# Patient Record
Sex: MALE | Race: WHITE | NOT HISPANIC OR LATINO | URBAN - NONMETROPOLITAN AREA
[De-identification: names, ages, dates, MRNs, and addresses within clinical notes are randomized per-mention and may not be internally consistent; named-entity substitution may affect disease eponyms.]

---

## 2020-08-24 ENCOUNTER — IMPORTED ENCOUNTER (OUTPATIENT)
Dept: URBAN - NONMETROPOLITAN AREA CLINIC 1 | Facility: CLINIC | Age: 70
End: 2020-08-24

## 2020-08-24 PROBLEM — S05.01XA: Noted: 2020-08-24

## 2020-08-24 PROCEDURE — 92071 CONTACT LENS FITTING FOR TX: CPT

## 2020-08-24 PROCEDURE — 92002 INTRM OPH EXAM NEW PATIENT: CPT

## 2020-08-24 NOTE — PATIENT DISCUSSION
*CORNEAL ABRASION:. od-  Discussed findings of exam in detail with the patient. -  Discussed the acute nature and treatment options with the patient. -  Discussed the importance of follow-up and the risk of serious infection   The patient was warned to return to the office immediately if they experience loss of vision or increased pain. -  The use of preserved artificial tears  was discussed with patient. insert bcl odstart maxitrol 1 gtt qid x 1wk

## 2020-08-27 ENCOUNTER — IMPORTED ENCOUNTER (OUTPATIENT)
Dept: URBAN - NONMETROPOLITAN AREA CLINIC 1 | Facility: CLINIC | Age: 70
End: 2020-08-27

## 2020-08-27 PROCEDURE — 92012 INTRM OPH EXAM EST PATIENT: CPT

## 2020-08-27 NOTE — PATIENT DISCUSSION
*CORNEAL ABRASION:. od improved-  Discussed findings of exam in detail with the patient. -  Discussed the acute nature and treatment options with the patient. -  Discussed the importance of follow-up and the risk of serious infection   The patient was warned to return to the office immediately if they experience loss of vision or increased pain. -  The use of preserved artificial tears  was discussed with patient. bcl od came outstart jose archie tid with pressure patch x 3 days then tears 4-5 times daily

## 2020-10-08 ENCOUNTER — IMPORTED ENCOUNTER (OUTPATIENT)
Dept: URBAN - NONMETROPOLITAN AREA CLINIC 1 | Facility: CLINIC | Age: 70
End: 2020-10-08

## 2020-10-08 PROBLEM — H25.13: Noted: 2020-10-08

## 2020-10-08 PROBLEM — H52.223: Noted: 2020-10-08

## 2020-10-08 PROCEDURE — 92015 DETERMINE REFRACTIVE STATE: CPT

## 2020-10-08 PROCEDURE — 92014 COMPRE OPH EXAM EST PT 1/>: CPT

## 2020-10-08 NOTE — PATIENT DISCUSSION
Cataract OU-Not yet surgical. -Reviewed symptoms of advancing cataract growth such as glare and halos and decreased vision.-Continue to monitor for now. Pt will notify us if any new symptoms develop. Astigmatism-Discussed diagnosis with patient. Hyperopia-Discussed diagnosis with patient. Presbyopia-Discussed diagnosis with patient. Updated spec Rx given. Recommend lens that will provide comfort as well as protect safety and health of eyes.

## 2021-11-08 ENCOUNTER — IMPORTED ENCOUNTER (OUTPATIENT)
Dept: URBAN - NONMETROPOLITAN AREA CLINIC 1 | Facility: CLINIC | Age: 71
End: 2021-11-08

## 2021-11-08 PROCEDURE — 92014 COMPRE OPH EXAM EST PT 1/>: CPT

## 2021-11-08 PROCEDURE — 92015 DETERMINE REFRACTIVE STATE: CPT

## 2021-11-08 NOTE — PATIENT DISCUSSION
Cataract OU +PROGRESSION-Not yet surgical. -Reviewed symptoms of advancing cataract growth such as glare and halos and decreased vision.-Continue to monitor for now. Pt will notify us if any new symptoms develop. Astigmatism-Discussed diagnosis with patient. Hyperopia-Discussed diagnosis with patient. Presbyopia-Discussed diagnosis with patient. Updated spec Rx given. Recommend lens that will provide comfort as well as protect safety and health of eyes.

## 2022-04-09 ASSESSMENT — VISUAL ACUITY
OD_SC: 20/40
OS_SC: 20/25-2
OD_CC: J1+ WITH DIFFICULTY
OD_SC: 20/40
OS_SC: 20/20-1
OS_CC: J1+ WITH DIFFICULTY
OD_SC: 20/70-2
OS_SC: 20/25-2
OD_SC: 20/30

## 2022-04-09 ASSESSMENT — TONOMETRY
OS_IOP_MMHG: 15
OD_IOP_MMHG: 15
OD_IOP_MMHG: 16
OS_IOP_MMHG: 16

## 2022-11-09 ENCOUNTER — COMPREHENSIVE EXAM (OUTPATIENT)
Dept: RURAL CLINIC 1 | Facility: CLINIC | Age: 72
End: 2022-11-09

## 2022-11-09 DIAGNOSIS — H52.223: ICD-10-CM

## 2022-11-09 DIAGNOSIS — H25.13: ICD-10-CM

## 2022-11-09 PROCEDURE — 92014 COMPRE OPH EXAM EST PT 1/>: CPT

## 2022-11-09 PROCEDURE — 92015 DETERMINE REFRACTIVE STATE: CPT

## 2022-11-09 ASSESSMENT — VISUAL ACUITY
OD_CC: 20/30-2
OS_CC: 20/30
OU_CC: 20/20
OU_CC: 20/30-1
OS_CC: 20/20
OD_CC: 20/20

## 2022-11-09 ASSESSMENT — TONOMETRY
OS_IOP_MMHG: 17
OD_IOP_MMHG: 16

## 2023-06-23 ENCOUNTER — ESTABLISHED PATIENT (OUTPATIENT)
Dept: RURAL CLINIC 1 | Facility: CLINIC | Age: 73
End: 2023-06-23

## 2023-06-23 DIAGNOSIS — H25.13: ICD-10-CM

## 2023-06-23 PROCEDURE — 92014 COMPRE OPH EXAM EST PT 1/>: CPT

## 2023-06-23 ASSESSMENT — TONOMETRY
OS_IOP_MMHG: 16
OD_IOP_MMHG: 16

## 2023-06-23 ASSESSMENT — VISUAL ACUITY
OS_CC: 20/30
OU_CC: 20/30
OD_CC: 20/30
OS_CC: 20/20-1
OD_PH: 20/25
OD_CC: 20/50
OU_CC: 20/20-1

## 2023-07-10 ENCOUNTER — CONSULTATION/EVALUATION (OUTPATIENT)
Dept: RURAL CLINIC 1 | Facility: CLINIC | Age: 73
End: 2023-07-10

## 2023-07-10 DIAGNOSIS — H25.13: ICD-10-CM

## 2023-07-10 PROCEDURE — 99214 OFFICE O/P EST MOD 30 MIN: CPT

## 2023-07-10 ASSESSMENT — TONOMETRY
OD_IOP_MMHG: 16
OS_IOP_MMHG: 17

## 2023-07-10 ASSESSMENT — KERATOMETRY
OS_AXISANGLE2_DEGREES: 065
OS_K2POWER_DIOPTERS: 44.75
OS_AXISANGLE_DEGREES: 155
OD_AXISANGLE2_DEGREES: 5
OS_AXISANGLE2_DEGREES: 180
OS_K2POWER_DIOPTERS: 43.75
OD_K2POWER_DIOPTERS: 45.25
OD_AXISANGLE_DEGREES: 45
OS_AXISANGLE_DEGREES: 90
OD_K1POWER_DIOPTERS: 44.50
OD_K2POWER_DIOPTERS: 45.50
OD_AXISANGLE_DEGREES: 95
OD_K1POWER_DIOPTERS: 44.00
OS_K1POWER_DIOPTERS: 44.25
OS_K1POWER_DIOPTERS: 43.25
OD_AXISANGLE2_DEGREES: 135

## 2023-07-10 ASSESSMENT — VISUAL ACUITY
OS_BAT: 20/70
OD_SC: 20/50-1
OD_BAT: 20/100
OD_CC: 20/60
OS_CC: 20/30
OS_CC: 20/30
OD_PAM: 20/25
OD_PH: 20/40-1
OU_CC: 20/25-2
OD_CC: 20/30
OU_SC: 20/40
OS_SC: 20/40
OS_PH: 20/25-1
OS_AM: 20/25
OU_CC: 20/30

## 2023-09-07 ENCOUNTER — PRE-OP/H&P (OUTPATIENT)
Dept: RURAL CLINIC 1 | Facility: CLINIC | Age: 73
End: 2023-09-07

## 2023-09-07 VITALS
HEIGHT: 69 IN | RESPIRATION RATE: 23 BRPM | WEIGHT: 220 LBS | DIASTOLIC BLOOD PRESSURE: 75 MMHG | HEART RATE: 50 BPM | BODY MASS INDEX: 32.58 KG/M2 | SYSTOLIC BLOOD PRESSURE: 122 MMHG

## 2023-09-07 DIAGNOSIS — Z01.818: ICD-10-CM

## 2023-09-07 DIAGNOSIS — H25.13: ICD-10-CM

## 2023-09-07 PROCEDURE — 99499 UNLISTED E&M SERVICE: CPT

## 2023-09-07 ASSESSMENT — KERATOMETRY
OD_K1POWER_DIOPTERS: 44.00
OS_AXISANGLE_DEGREES: 155
OS_K1POWER_DIOPTERS: 43.25
OS_K1POWER_DIOPTERS: 44.25
OD_AXISANGLE2_DEGREES: 5
OD_AXISANGLE2_DEGREES: 135
OS_K2POWER_DIOPTERS: 43.75
OS_AXISANGLE2_DEGREES: 180
OS_K2POWER_DIOPTERS: 44.75
OS_AXISANGLE_DEGREES: 90
OD_AXISANGLE_DEGREES: 95
OD_K1POWER_DIOPTERS: 44.50
OS_AXISANGLE2_DEGREES: 065
OD_AXISANGLE_DEGREES: 45
OD_K2POWER_DIOPTERS: 45.25
OD_K2POWER_DIOPTERS: 45.50

## 2023-09-19 ENCOUNTER — SURGERY/PROCEDURE (OUTPATIENT)
Dept: URBAN - METROPOLITAN AREA SURGERY 3 | Facility: SURGERY | Age: 73
End: 2023-09-19

## 2023-09-19 DIAGNOSIS — H25.11: ICD-10-CM

## 2023-09-19 PROCEDURE — 68841 INSJ RX ELUT IMPLT LAC CANAL: CPT

## 2023-09-19 PROCEDURE — 66984 XCAPSL CTRC RMVL W/O ECP: CPT

## 2023-09-19 ASSESSMENT — KERATOMETRY
OD_K2POWER_DIOPTERS: 45.25
OS_K1POWER_DIOPTERS: 44.25
OS_K1POWER_DIOPTERS: 43.25
OD_AXISANGLE2_DEGREES: 135
OD_K2POWER_DIOPTERS: 45.50
OS_AXISANGLE_DEGREES: 155
OS_AXISANGLE2_DEGREES: 180
OD_K1POWER_DIOPTERS: 44.50
OD_K1POWER_DIOPTERS: 44.00
OS_K2POWER_DIOPTERS: 43.75
OS_AXISANGLE2_DEGREES: 065
OS_K2POWER_DIOPTERS: 44.75
OS_AXISANGLE_DEGREES: 90
OD_AXISANGLE2_DEGREES: 5
OD_AXISANGLE_DEGREES: 45
OD_AXISANGLE_DEGREES: 95

## 2023-09-20 ENCOUNTER — POST-OP (OUTPATIENT)
Dept: RURAL CLINIC 1 | Facility: CLINIC | Age: 73
End: 2023-09-20

## 2023-09-20 DIAGNOSIS — H25.12: ICD-10-CM

## 2023-09-20 DIAGNOSIS — Z96.1: ICD-10-CM

## 2023-09-20 PROCEDURE — 99024 POSTOP FOLLOW-UP VISIT: CPT

## 2023-09-20 ASSESSMENT — KERATOMETRY
OD_AXISANGLE2_DEGREES: 5
OD_AXISANGLE_DEGREES: 45
OS_AXISANGLE2_DEGREES: 065
OD_AXISANGLE_DEGREES: 95
OS_AXISANGLE_DEGREES: 90
OD_K2POWER_DIOPTERS: 45.50
OS_AXISANGLE_DEGREES: 155
OD_K2POWER_DIOPTERS: 45.25
OD_K1POWER_DIOPTERS: 44.50
OD_AXISANGLE2_DEGREES: 135
OS_K2POWER_DIOPTERS: 44.75
OS_K2POWER_DIOPTERS: 43.75
OS_K1POWER_DIOPTERS: 44.25
OD_K1POWER_DIOPTERS: 44.00
OS_K1POWER_DIOPTERS: 43.25
OS_AXISANGLE2_DEGREES: 180

## 2023-09-20 ASSESSMENT — TONOMETRY: OD_IOP_MMHG: 17

## 2023-09-20 ASSESSMENT — VISUAL ACUITY: OD_SC: 20/200

## 2023-09-25 ENCOUNTER — POST OP/EVAL OF SECOND EYE (OUTPATIENT)
Dept: RURAL CLINIC 1 | Facility: CLINIC | Age: 73
End: 2023-09-25

## 2023-09-25 DIAGNOSIS — Z96.1: ICD-10-CM

## 2023-09-25 DIAGNOSIS — H25.12: ICD-10-CM

## 2023-09-25 PROCEDURE — 99024 POSTOP FOLLOW-UP VISIT: CPT

## 2023-09-25 ASSESSMENT — KERATOMETRY
OS_K2POWER_DIOPTERS: 43.75
OS_K1POWER_DIOPTERS: 44.25
OD_K1POWER_DIOPTERS: 44.00
OS_AXISANGLE_DEGREES: 155
OD_AXISANGLE_DEGREES: 95
OS_AXISANGLE_DEGREES: 90
OS_AXISANGLE2_DEGREES: 065
OD_AXISANGLE2_DEGREES: 135
OS_AXISANGLE2_DEGREES: 180
OD_K2POWER_DIOPTERS: 45.25
OS_K1POWER_DIOPTERS: 43.25
OD_AXISANGLE_DEGREES: 45
OD_K1POWER_DIOPTERS: 44.50
OD_K2POWER_DIOPTERS: 45.50
OS_K2POWER_DIOPTERS: 44.75
OD_AXISANGLE2_DEGREES: 5

## 2023-09-25 ASSESSMENT — VISUAL ACUITY
OS_SC: 20/25+1
OU_SC: 20/100
OS_AM: 20/30-1
OS_SC: 20/200
OD_SC: 20/50+1
OU_SC: 20/25+1
OS_BAT: 20/400

## 2023-09-25 ASSESSMENT — TONOMETRY
OS_IOP_MMHG: 17
OD_IOP_MMHG: 17

## 2023-10-03 ENCOUNTER — SURGERY/PROCEDURE (OUTPATIENT)
Dept: URBAN - METROPOLITAN AREA SURGERY 3 | Facility: SURGERY | Age: 73
End: 2023-10-03

## 2023-10-03 DIAGNOSIS — H25.12: ICD-10-CM

## 2023-10-03 PROCEDURE — 68841 INSJ RX ELUT IMPLT LAC CANAL: CPT | Mod: 51,LT,79,LT

## 2023-10-03 PROCEDURE — 66984 XCAPSL CTRC RMVL W/O ECP: CPT | Mod: 79,LT

## 2023-10-03 ASSESSMENT — KERATOMETRY
OS_K1POWER_DIOPTERS: 44.25
OS_AXISANGLE_DEGREES: 155
OS_AXISANGLE2_DEGREES: 180
OD_K1POWER_DIOPTERS: 44.50
OS_K1POWER_DIOPTERS: 43.25
OD_AXISANGLE_DEGREES: 45
OS_AXISANGLE_DEGREES: 90
OD_K1POWER_DIOPTERS: 44.00
OD_AXISANGLE2_DEGREES: 135
OD_AXISANGLE_DEGREES: 95
OD_K2POWER_DIOPTERS: 45.50
OD_AXISANGLE2_DEGREES: 5
OD_K2POWER_DIOPTERS: 45.25
OS_K2POWER_DIOPTERS: 44.75
OS_K2POWER_DIOPTERS: 43.75
OS_AXISANGLE2_DEGREES: 065

## 2023-10-04 ENCOUNTER — POST-OP (OUTPATIENT)
Dept: RURAL CLINIC 1 | Facility: CLINIC | Age: 73
End: 2023-10-04

## 2023-10-04 DIAGNOSIS — Z96.1: ICD-10-CM

## 2023-10-04 PROCEDURE — 99024 POSTOP FOLLOW-UP VISIT: CPT

## 2023-10-04 ASSESSMENT — VISUAL ACUITY
OD_SC: 20/50
OD_SC: 20/100
OU_SC: 20/40+1
OS_SC: 20/40-1
OS_SC: 20/100
OU_SC: 20/100

## 2023-10-04 ASSESSMENT — TONOMETRY
OS_IOP_MMHG: 17
OD_IOP_MMHG: 17

## 2023-10-12 ENCOUNTER — POST-OP (OUTPATIENT)
Dept: RURAL CLINIC 1 | Facility: CLINIC | Age: 73
End: 2023-10-12

## 2023-10-12 DIAGNOSIS — Z96.1: ICD-10-CM

## 2023-10-12 PROCEDURE — 99024 POSTOP FOLLOW-UP VISIT: CPT

## 2023-10-12 ASSESSMENT — TONOMETRY
OD_IOP_MMHG: 17
OS_IOP_MMHG: 17

## 2023-10-12 ASSESSMENT — VISUAL ACUITY
OD_SC: 20/60
OU_SC: 20/30
OS_SC: 20/30-1

## 2023-11-02 ENCOUNTER — POST-OP (OUTPATIENT)
Dept: RURAL CLINIC 1 | Facility: CLINIC | Age: 73
End: 2023-11-02

## 2023-11-02 DIAGNOSIS — Z96.1: ICD-10-CM

## 2023-11-02 PROCEDURE — 99024 POSTOP FOLLOW-UP VISIT: CPT

## 2023-11-02 ASSESSMENT — VISUAL ACUITY
OS_SC: 20/25
OD_PH: 20/30
OD_SC: 20/60-2
OU_SC: 20/30

## 2023-11-02 ASSESSMENT — TONOMETRY
OS_IOP_MMHG: 14
OD_IOP_MMHG: 14

## 2024-03-12 ENCOUNTER — FOLLOW UP (OUTPATIENT)
Dept: RURAL CLINIC 1 | Facility: CLINIC | Age: 74
End: 2024-03-12

## 2024-03-12 DIAGNOSIS — Z96.1: ICD-10-CM

## 2024-03-12 PROCEDURE — 92014 COMPRE OPH EXAM EST PT 1/>: CPT

## 2024-03-12 ASSESSMENT — VISUAL ACUITY
OD_PH: 20/40+2
OS_PH: 20/25
OS_SC: 20/30
OD_SC: 20/60

## 2024-03-12 ASSESSMENT — TONOMETRY
OD_IOP_MMHG: 14
OS_IOP_MMHG: 14

## 2024-03-22 ENCOUNTER — ESTABLISHED PATIENT (OUTPATIENT)
Dept: RURAL CLINIC 1 | Facility: CLINIC | Age: 74
End: 2024-03-22

## 2024-03-22 DIAGNOSIS — H52.4: ICD-10-CM

## 2024-03-22 DIAGNOSIS — Z96.1: ICD-10-CM

## 2024-03-22 PROCEDURE — 92015 DETERMINE REFRACTIVE STATE: CPT

## 2024-03-22 PROCEDURE — 92014 COMPRE OPH EXAM EST PT 1/>: CPT

## 2024-03-22 ASSESSMENT — VISUAL ACUITY
OU_SC: 20/30
OU_SC: 20/20
OS_SC: 20/30
OD_SC: 20/40
OD_SC: 20/30
OS_SC: 20/20

## 2025-02-07 ENCOUNTER — FOLLOW UP (OUTPATIENT)
Age: 75
End: 2025-02-07

## 2025-02-07 DIAGNOSIS — Z96.1: ICD-10-CM

## 2025-02-07 PROCEDURE — 92014 COMPRE OPH EXAM EST PT 1/>: CPT

## 2025-06-03 ENCOUNTER — EMERGENCY VISIT (OUTPATIENT)
Age: 75
End: 2025-06-03

## 2025-06-03 DIAGNOSIS — Z96.1: ICD-10-CM

## 2025-06-03 DIAGNOSIS — H17.11: ICD-10-CM

## 2025-06-03 PROCEDURE — 92014 COMPRE OPH EXAM EST PT 1/>: CPT
